# Patient Record
Sex: MALE | HISPANIC OR LATINO | URBAN - METROPOLITAN AREA
[De-identification: names, ages, dates, MRNs, and addresses within clinical notes are randomized per-mention and may not be internally consistent; named-entity substitution may affect disease eponyms.]

---

## 2022-01-19 ENCOUNTER — IMMUNIZATIONS (OUTPATIENT)
Dept: FAMILY MEDICINE CLINIC | Facility: HOSPITAL | Age: 42
End: 2022-01-19

## 2022-01-19 DIAGNOSIS — Z23 ENCOUNTER FOR IMMUNIZATION: Primary | ICD-10-CM

## 2022-01-19 PROCEDURE — 91306 COVID-19 MODERNA VACC 0.25 ML BOOSTER: CPT

## 2022-01-19 PROCEDURE — 0064A COVID-19 MODERNA VACC 0.25 ML BOOSTER: CPT

## 2022-10-20 ENCOUNTER — OFFICE VISIT (OUTPATIENT)
Dept: URGENT CARE | Facility: CLINIC | Age: 42
End: 2022-10-20
Payer: COMMERCIAL

## 2022-10-20 VITALS
BODY MASS INDEX: 28.49 KG/M2 | TEMPERATURE: 98.5 F | DIASTOLIC BLOOD PRESSURE: 79 MMHG | SYSTOLIC BLOOD PRESSURE: 127 MMHG | HEIGHT: 65 IN | WEIGHT: 171 LBS | RESPIRATION RATE: 18 BRPM | OXYGEN SATURATION: 99 % | HEART RATE: 69 BPM

## 2022-10-20 DIAGNOSIS — R51.9 ACUTE NONINTRACTABLE HEADACHE, UNSPECIFIED HEADACHE TYPE: Primary | ICD-10-CM

## 2022-10-20 LAB
SARS-COV-2 AG UPPER RESP QL IA: NEGATIVE
SL AMB POCT GLUCOSE BLD: 189
VALID CONTROL: NORMAL

## 2022-10-20 PROCEDURE — 87811 SARS-COV-2 COVID19 W/OPTIC: CPT | Performed by: PHYSICIAN ASSISTANT

## 2022-10-20 PROCEDURE — 82948 REAGENT STRIP/BLOOD GLUCOSE: CPT | Performed by: PHYSICIAN ASSISTANT

## 2022-10-20 PROCEDURE — 99214 OFFICE O/P EST MOD 30 MIN: CPT | Performed by: PHYSICIAN ASSISTANT

## 2022-10-20 RX ORDER — GLIPIZIDE 10 MG/1
10 TABLET ORAL
COMMUNITY

## 2022-10-20 RX ORDER — LISINOPRIL 2.5 MG/1
2.5 TABLET ORAL DAILY
COMMUNITY

## 2022-10-20 NOTE — PATIENT INSTRUCTIONS
Follow-up with a family doctor  In general we do not do medication refills  If you have an episode of hypoglycemia again go to the emergency room  You tested negative for COVID today

## 2022-10-20 NOTE — PROGRESS NOTES
[facility] [employer] [facility]        NAME: Enriqueta Frank is a 38 y o  male  : 1980    MRN: [id number]  DATE: 2022  TIME: 11:37 AM    Assessment and Plan   Acute nonintractable headache, unspecified headache type [R51 9]  1  Acute nonintractable headache, unspecified headache type  Poct Covid 19 Rapid Antigen Test    POCT blood glucose     The pt is not symptomatic at this time   He had a hypoglycemic yesterday without taking his meds   Recommended PCP's in this area to him         Patient Instructions   Patient Instructions   Follow-up with a family doctor  In general we do not do medication refills  If you have an episode of hypoglycemia again go to the emergency room  You tested negative for COVID today  Follow up with PCP in 3-5 days  Proceed to  ER if symptoms worsen  Chief Complaint     Chief Complaint   Patient presents with   • Headache     Is a diabetic and needs a pcp sent from [facility] with headache and slight  dizziness felt sweaty yesterday   fasting  History of Present Illness       The patient is a 38 year male presenting today for a med refill  He is diabetic and is almost out of his meds  He was sent here today from [facility] for a "med refill "  Blood sugar is 189 today after tea and a piece of bread  Yesterday he felt diaphoretic, and dizzy while running around at his sons soccer game  He believes this was an episode of hypoglycemia  He has had this before  His last episode was 2 years ago  No current symptoms  He denies any shortness of breath or chest pain  Denied any headache dizziness or blurry vision  Review of Systems   Review of Systems   Constitutional: Negative for activity change, appetite change, chills, diaphoresis and fever  HENT: Negative for congestion, ear pain, rhinorrhea and sore throat  Eyes: Negative for pain and visual disturbance  Respiratory: Negative for cough, chest tightness and shortness of breath  Cardiovascular: Negative for chest pain and palpitations  Gastrointestinal: Negative for abdominal pain, diarrhea, nausea and vomiting  Genitourinary: Negative for dysuria and hematuria  Musculoskeletal: Negative for arthralgias, back pain and myalgias  Skin: Negative for color change, pallor and rash  Neurological: Negative for dizziness, seizures, syncope and headaches  All other systems reviewed and are negative  Current Medications       Current Outpatient Medications:   •  glipiZIDE (GLUCOTROL) 10 mg tablet, Take 10 mg by mouth 2 (two) times a day before meals, Disp: , Rfl:   •  lisinopril (ZESTRIL) 2 5 mg tablet, Take 2 5 mg by mouth daily, Disp: , Rfl:   •  metFORMIN (GLUCOPHAGE) 1000 MG tablet, Take 1,000 mg by mouth 2 (two) times a day with meals, Disp: , Rfl:   •  sitaGLIPtin (JANUVIA) 100 mg tablet, Take 100 mg by mouth daily, Disp: , Rfl:     Current Allergies     Allergies as of 10/20/2022   • (No Known Allergies)            The following portions of the patient's history were reviewed and updated as appropriate: allergies, current medications, past family history, past medical history, past social history, past surgical history and problem list      Past Medical History:   Diagnosis Date   • Diabetes mellitus ([facility] [address] )    • Hypertension        No past surgical history on file  No family history on file  Medications have been verified  Objective   /79   Pulse 69   Temp 98 5 °F (36 9 °C)   Resp 18   Ht 5' 5" (1 651 m)   Wt 77 6 kg (171 lb)   SpO2 99%   BMI 28 46 kg/m²        Physical Exam     Physical Exam  Vitals and nursing note reviewed  Constitutional:       General: He is not in acute distress  Appearance: Normal appearance  He is normal weight  He is not ill-appearing, toxic-appearing or diaphoretic  HENT:      Head: Normocephalic and atraumatic  Right Ear: Tympanic membrane, ear canal and external ear normal  There is no impacted cerumen  Left Ear: Tympanic membrane, ear canal and external ear normal  There is no impacted cerumen  Nose: Nose normal  No congestion or rhinorrhea  Mouth/Throat:      Mouth: Mucous membranes are moist       Pharynx: Oropharynx is clear  No oropharyngeal exudate or posterior oropharyngeal erythema  Eyes:      Extraocular Movements: Extraocular movements intact  Conjunctiva/sclera: Conjunctivae normal       Pupils: Pupils are equal, round, and reactive to light  Neck:      Vascular: No carotid bruit  Cardiovascular:      Rate and Rhythm: Normal rate and regular rhythm  Heart sounds: Normal heart sounds  No murmur heard  No friction rub  No gallop  Pulmonary:      Effort: Pulmonary effort is normal  No respiratory distress  Breath sounds: Normal breath sounds  No stridor  No wheezing, rhonchi or rales  Chest:      Chest wall: No tenderness  Abdominal:      General: Abdomen is flat  Bowel sounds are normal  There is no distension  Palpations: Abdomen is soft  There is no mass  Tenderness: There is no abdominal tenderness  There is no guarding or rebound  Hernia: No hernia is present  Musculoskeletal:         General: Normal range of motion  Cervical back: Normal range of motion  No rigidity or tenderness  Lymphadenopathy:      Cervical: No cervical adenopathy  Skin:     General: Skin is warm and dry  Capillary Refill: Capillary refill takes less than 2 seconds  Neurological:      Mental Status: He is alert

## 2024-02-14 ENCOUNTER — HOSPITAL ENCOUNTER (EMERGENCY)
Facility: HOSPITAL | Age: 44
Discharge: HOME | End: 2024-02-14
Attending: EMERGENCY MEDICINE
Payer: COMMERCIAL

## 2024-02-14 VITALS
SYSTOLIC BLOOD PRESSURE: 162 MMHG | HEIGHT: 65 IN | DIASTOLIC BLOOD PRESSURE: 85 MMHG | WEIGHT: 168.8 LBS | BODY MASS INDEX: 28.12 KG/M2 | OXYGEN SATURATION: 100 % | RESPIRATION RATE: 18 BRPM | HEART RATE: 82 BPM | TEMPERATURE: 97.5 F

## 2024-02-14 DIAGNOSIS — L97.509 DIABETIC FOOT ULCER (HCC): Primary | ICD-10-CM

## 2024-02-14 DIAGNOSIS — Z76.0 MEDICATION REFILL: ICD-10-CM

## 2024-02-14 DIAGNOSIS — E11.621 DIABETIC FOOT ULCER (HCC): Primary | ICD-10-CM

## 2024-02-14 PROCEDURE — 99284 EMERGENCY DEPT VISIT MOD MDM: CPT | Performed by: EMERGENCY MEDICINE

## 2024-02-14 PROCEDURE — 99282 EMERGENCY DEPT VISIT SF MDM: CPT

## 2024-02-14 RX ORDER — GLIPIZIDE 10 MG/1
10 TABLET ORAL
Qty: 60 TABLET | Refills: 0 | Status: SHIPPED | OUTPATIENT
Start: 2024-02-14

## 2024-02-14 RX ORDER — GINSENG 100 MG
1 CAPSULE ORAL ONCE
Status: COMPLETED | OUTPATIENT
Start: 2024-02-14 | End: 2024-02-14

## 2024-02-14 RX ORDER — LISINOPRIL 2.5 MG/1
2.5 TABLET ORAL DAILY
Qty: 30 TABLET | Refills: 0 | Status: SHIPPED | OUTPATIENT
Start: 2024-02-14

## 2024-02-14 RX ORDER — AMOXICILLIN AND CLAVULANATE POTASSIUM 875; 125 MG/1; MG/1
1 TABLET, FILM COATED ORAL EVERY 12 HOURS SCHEDULED
Qty: 14 TABLET | Refills: 0 | Status: SHIPPED | OUTPATIENT
Start: 2024-02-14 | End: 2024-02-21

## 2024-02-14 RX ORDER — AMOXICILLIN AND CLAVULANATE POTASSIUM 875; 125 MG/1; MG/1
1 TABLET, FILM COATED ORAL ONCE
Status: COMPLETED | OUTPATIENT
Start: 2024-02-14 | End: 2024-02-14

## 2024-02-14 RX ADMIN — BACITRACIN 1 SMALL APPLICATION: 500 OINTMENT TOPICAL at 19:06

## 2024-02-14 RX ADMIN — AMOXICILLIN AND CLAVULANATE POTASSIUM 1 TABLET: 875; 125 TABLET, FILM COATED ORAL at 19:06

## 2024-02-14 NOTE — Clinical Note
Elan Latham was seen and treated in our emergency department on 2/14/2024.                Diagnosis:     Elan  may return to work on return date.    He may return on this date: 02/19/2024         If you have any questions or concerns, please don't hesitate to call.      Aisha Rios MD    ______________________________           _______________          _______________  Hospital Representative                              Date                                Time

## 2024-02-14 NOTE — DISCHARGE INSTRUCTIONS
Rest off of foot, elevate foot off and on.  Take the antibiotic as prescribed.  Clean daily and apply topical nesporin ointment and keep cushioned with padding if you have to be on the foot.  Follow up with the podiatrist and/or wound care - whoever you can get in with sooner.

## 2024-02-15 NOTE — ED PROVIDER NOTES
History  Chief Complaint   Patient presents with    Wound Check     Pt c/o wound to bottom of left foot w/ Dx diabetes      42 yo male noted pain on bottom of left foot at work today.  Hurts to walk on it.  Asked his wife to look at it and she noted wound.  No specific injury but he is on his feet at work a lot and has to wear heavy boots.  No fever, cough, vomiting, diarrhea.  The pain goes up a bit into his toes.  He also needs refills on his lisinopril and glipizide and hasn't been able to get a hold of his doctor.      History provided by:  Patient   used: No    Wound Check         Prior to Admission Medications   Prescriptions Last Dose Informant Patient Reported? Taking?   glipiZIDE (GLUCOTROL) 10 mg tablet Past Month  Yes Yes   Sig: Take 10 mg by mouth 2 (two) times a day before meals   glipiZIDE (GLUCOTROL) 10 mg tablet   No Yes   Sig: Take 1 tablet (10 mg total) by mouth 2 (two) times a day before meals   lisinopril (ZESTRIL) 2.5 mg tablet 2/13/2024  Yes Yes   Sig: Take 2.5 mg by mouth daily   lisinopril (ZESTRIL) 2.5 mg tablet   No Yes   Sig: Take 1 tablet (2.5 mg total) by mouth daily   metFORMIN (GLUCOPHAGE) 1000 MG tablet 2/14/2024  Yes Yes   Sig: Take 1,000 mg by mouth 2 (two) times a day with meals   sitaGLIPtin (JANUVIA) 100 mg tablet Not Taking  Yes No   Sig: Take 100 mg by mouth daily   Patient not taking: Reported on 2/14/2024      Facility-Administered Medications: None       Past Medical History:   Diagnosis Date    Diabetes mellitus (HCC)     Hypertension        No past surgical history on file.    No family history on file.  I have reviewed and agree with the history as documented.    E-Cigarette/Vaping     E-Cigarette/Vaping Substances          Review of Systems   Constitutional:  Negative for fever.   Respiratory:  Negative for cough.    Gastrointestinal:  Negative for diarrhea and vomiting.   Musculoskeletal:  Positive for gait problem.   Skin:  Positive for wound.  Negative for rash.       Physical Exam  Physical Exam  Vitals and nursing note reviewed.   Constitutional:       General: He is not in acute distress.     Appearance: He is not ill-appearing.   Pulmonary:      Effort: Pulmonary effort is normal. No respiratory distress.   Musculoskeletal:         General: Tenderness present. Normal range of motion.   Skin:     General: Skin is warm and dry.      Findings: Lesion present.      Comments: Bottom of left foot, ball of foot + ulcer wound 4x2 cm with thickened area and then small area blood under the skin but no fluid filled blister area.  Mildly tender.  No drainage, no surrounding redness, no swelling.  DP palp. - see picture   Neurological:      General: No focal deficit present.      Mental Status: He is alert and oriented to person, place, and time.   Psychiatric:         Mood and Affect: Mood normal.         Behavior: Behavior normal.         Vital Signs  ED Triage Vitals [02/14/24 1841]   Temperature Pulse Respirations Blood Pressure SpO2   97.5 °F (36.4 °C) 82 18 162/85 100 %      Temp Source Heart Rate Source Patient Position - Orthostatic VS BP Location FiO2 (%)   Temporal Monitor Sitting Right arm --      Pain Score       8           Vitals:    02/14/24 1841   BP: 162/85   Pulse: 82   Patient Position - Orthostatic VS: Sitting         Visual Acuity      ED Medications  Medications   bacitracin topical ointment 1 small application (has no administration in time range)   amoxicillin-clavulanate (AUGMENTIN) 875-125 mg per tablet 1 tablet (has no administration in time range)       Diagnostic Studies  Results Reviewed       None                   No orders to display              Procedures  Procedures         ED Course                               SBIRT 20yo+      Flowsheet Row Most Recent Value   Initial Alcohol Screen: US AUDIT-C     1. How often do you have a drink containing alcohol? 0 Filed at: 02/14/2024 4993   2. How many drinks containing alcohol do you  have on a typical day you are drinking?  0 Filed at: 02/14/2024 1843   3a. Male UNDER 65: How often do you have five or more drinks on one occasion? 0 Filed at: 02/14/2024 1843   Audit-C Score 0 Filed at: 02/14/2024 1843   KIMBERLI: How many times in the past year have you...    Used an illegal drug or used a prescription medication for non-medical reasons? Never Filed at: 02/14/2024 1843                      Medical Decision Making  Advised rest, foot elevated, abx and follow up outpt. Podiatry and/or wound care.  Given refills on requested meds.    Risk  OTC drugs.  Prescription drug management.             Disposition  Final diagnoses:   Diabetic foot ulcer (HCC)   Medication refill     Time reflects when diagnosis was documented in both MDM as applicable and the Disposition within this note       Time User Action Codes Description Comment    2/14/2024  6:56 PM Aisha iRos Add [E11.621,  L97.509] Diabetic foot ulcer (HCC)     2/14/2024  7:01 PM Aisha Rios Add [Z76.0] Medication refill           ED Disposition       ED Disposition   Discharge    Condition   Stable    Date/Time   Wed Feb 14, 2024 1856    Comment   Elan Latham discharge to home/self care.                   Follow-up Information    None         Patient's Medications   Discharge Prescriptions    AMOXICILLIN-CLAVULANATE (AUGMENTIN) 875-125 MG PER TABLET    Take 1 tablet by mouth every 12 (twelve) hours for 7 days       Start Date: 2/14/2024 End Date: 2/21/2024       Order Dose: 1 tablet       Quantity: 14 tablet    Refills: 0           PDMP Review       None            ED Provider  Electronically Signed by             Aisha Rios MD  02/14/24 0701

## 2024-02-19 ENCOUNTER — OFFICE VISIT (OUTPATIENT)
Age: 44
End: 2024-02-19
Payer: COMMERCIAL

## 2024-02-19 VITALS
WEIGHT: 171 LBS | HEART RATE: 77 BPM | RESPIRATION RATE: 16 BRPM | SYSTOLIC BLOOD PRESSURE: 138 MMHG | DIASTOLIC BLOOD PRESSURE: 70 MMHG | BODY MASS INDEX: 28.49 KG/M2 | HEIGHT: 65 IN

## 2024-02-19 DIAGNOSIS — E11.621 DIABETIC FOOT ULCER (HCC): ICD-10-CM

## 2024-02-19 DIAGNOSIS — E11.42 DIABETIC POLYNEUROPATHY ASSOCIATED WITH TYPE 2 DIABETES MELLITUS (HCC): ICD-10-CM

## 2024-02-19 DIAGNOSIS — M21.962 ACQUIRED DEFORMITY OF BOTH FEET: ICD-10-CM

## 2024-02-19 DIAGNOSIS — L97.421 DIABETIC ULCER OF LEFT MIDFOOT ASSOCIATED WITH TYPE 2 DIABETES MELLITUS, LIMITED TO BREAKDOWN OF SKIN (HCC): Primary | ICD-10-CM

## 2024-02-19 DIAGNOSIS — M77.42 METATARSALGIA OF BOTH FEET: ICD-10-CM

## 2024-02-19 DIAGNOSIS — M21.961 ACQUIRED DEFORMITY OF BOTH FEET: ICD-10-CM

## 2024-02-19 DIAGNOSIS — L97.509 DIABETIC FOOT ULCER (HCC): ICD-10-CM

## 2024-02-19 DIAGNOSIS — E11.621 DIABETIC ULCER OF LEFT MIDFOOT ASSOCIATED WITH TYPE 2 DIABETES MELLITUS, LIMITED TO BREAKDOWN OF SKIN (HCC): Primary | ICD-10-CM

## 2024-02-19 DIAGNOSIS — M77.41 METATARSALGIA OF BOTH FEET: ICD-10-CM

## 2024-02-19 PROCEDURE — 87205 SMEAR GRAM STAIN: CPT | Performed by: PODIATRIST

## 2024-02-19 PROCEDURE — 99203 OFFICE O/P NEW LOW 30 MIN: CPT | Performed by: PODIATRIST

## 2024-02-19 PROCEDURE — 87070 CULTURE OTHR SPECIMN AEROBIC: CPT | Performed by: PODIATRIST

## 2024-02-19 PROCEDURE — 97597 DBRDMT OPN WND 1ST 20 CM/<: CPT | Performed by: PODIATRIST

## 2024-02-19 NOTE — PROGRESS NOTES
"Assessment/Plan: Schmidt grade 1 ulcer submetatarsal 3 left foot.  Diabetic neuropathy.  Acquired deformity foot causing pressure point bilateral.  Patient with out cellulitis.  Pain upon ambulation.    Plan.  Chart reviewed.  PCP notes reviewed.  Patient examined.  With use of  patient has been advised of diabetic foot ulcer and its etiology and treatment.  Today he consents to wound debridement.  This was done.  In addition gentian violet dry sterile dressing applied.  Patient may started on Silvadene daily.  In addition, he will watch for signs of infection.  Return for follow-up.    Debridement    Universal Protocol:  Consent: Verbal consent obtained.  Risks and benefits: risks, benefits and alternatives were discussed  Consent given by: patient  Time out: Immediately prior to procedure a \"time out\" was called to verify the correct patient, procedure, equipment, support staff and site/side marked as required.  Timeout called at: 2/19/2024 2:37 PM.  Patient understanding: patient states understanding of the procedure being performed  Patient identity confirmed: verbally with patient    Debridement Details  Performed by: physician  Debridement type: selective  Pain control: none      Post-debridement measurements  Length (cm): 0.5  Width (cm): 0.5  Depth (cm): 0.1  Percent debrided: 100%  Surface Area (cm^2): 0.25  Area Debrided (cm^2): 0.25  Volume (cm^3): 0.03    Devitalized tissue debrided: callus, clots and eschar  Instrument(s) utilized: blade  Bleeding: none  Hemostasis obtained with: not applicable  Procedural pain (0-10): insensate  Post-procedural pain: insensate   Response to treatment: procedure was tolerated well                     Diagnoses and all orders for this visit:    Diabetic foot ulcer (HCC)  -     Ambulatory Referral to Podiatry          Subjective: Patient is seen in referral from emergency room.  Patient is diabetic with a foot wound of the left foot.  Is been there for several " weeks.  No history of trauma            No Known Allergies      Current Outpatient Medications:     amoxicillin-clavulanate (AUGMENTIN) 875-125 mg per tablet, Take 1 tablet by mouth every 12 (twelve) hours for 7 days, Disp: 14 tablet, Rfl: 0    glipiZIDE (GLUCOTROL) 10 mg tablet, Take 1 tablet (10 mg total) by mouth 2 (two) times a day before meals, Disp: 60 tablet, Rfl: 0    lisinopril (ZESTRIL) 2.5 mg tablet, Take 1 tablet (2.5 mg total) by mouth daily, Disp: 30 tablet, Rfl: 0    metFORMIN (GLUCOPHAGE) 1000 MG tablet, Take 1,000 mg by mouth 2 (two) times a day with meals, Disp: , Rfl:     sitaGLIPtin (JANUVIA) 100 mg tablet, Take 100 mg by mouth daily (Patient not taking: Reported on 2/14/2024), Disp: , Rfl:     There is no problem list on file for this patient.         Patient ID: Elan Latham is a 43 y.o. male.    HPI    The following portions of the patient's history were reviewed and updated as appropriate: He  has a past medical history of Diabetes mellitus (HCC) and Hypertension.  He There are no problems to display for this patient.    He  has no past surgical history on file.  His family history is not on file.  He  has no history on file for tobacco use, alcohol use, and drug use.  Current Outpatient Medications   Medication Sig Dispense Refill    amoxicillin-clavulanate (AUGMENTIN) 875-125 mg per tablet Take 1 tablet by mouth every 12 (twelve) hours for 7 days 14 tablet 0    glipiZIDE (GLUCOTROL) 10 mg tablet Take 1 tablet (10 mg total) by mouth 2 (two) times a day before meals 60 tablet 0    lisinopril (ZESTRIL) 2.5 mg tablet Take 1 tablet (2.5 mg total) by mouth daily 30 tablet 0    metFORMIN (GLUCOPHAGE) 1000 MG tablet Take 1,000 mg by mouth 2 (two) times a day with meals      sitaGLIPtin (JANUVIA) 100 mg tablet Take 100 mg by mouth daily (Patient not taking: Reported on 2/14/2024)       No current facility-administered medications for this visit.     Current Outpatient Medications on File Prior to  Visit   Medication Sig    amoxicillin-clavulanate (AUGMENTIN) 875-125 mg per tablet Take 1 tablet by mouth every 12 (twelve) hours for 7 days    glipiZIDE (GLUCOTROL) 10 mg tablet Take 1 tablet (10 mg total) by mouth 2 (two) times a day before meals    lisinopril (ZESTRIL) 2.5 mg tablet Take 1 tablet (2.5 mg total) by mouth daily    metFORMIN (GLUCOPHAGE) 1000 MG tablet Take 1,000 mg by mouth 2 (two) times a day with meals    sitaGLIPtin (JANUVIA) 100 mg tablet Take 100 mg by mouth daily (Patient not taking: Reported on 2/14/2024)     No current facility-administered medications on file prior to visit.     He has No Known Allergies..    There were no vitals filed for this visit.    Review of Systems      Objective:  Patient's shoes and socks removed.   Foot Exam    General  General Appearance: appears stated age and healthy   Orientation: alert and oriented to person, place, and time   Affect: appropriate   Gait: antalgic       Right Foot/Ankle     Inspection and Palpation  Swelling: dorsum   Arch: pes planus  Hammertoes: fifth toe    Neurovascular  Dorsalis pedis: 3+  Posterior tibial: 3+  Saphenous nerve sensation: diminished  Tibial nerve sensation: diminished  Superficial peroneal nerve sensation: diminished  Deep peroneal nerve sensation: diminished  Sural nerve sensation: diminished      Left Foot/Ankle      Inspection and Palpation  Swelling: dorsum   Arch: pes planus  Hammertoes: fifth toe  Skin Exam: ulcer;     Neurovascular  Dorsalis pedis: 3+  Posterior tibial: 3+  Saphenous nerve sensation: diminished  Tibial nerve sensation: diminished  Superficial peroneal nerve sensation: diminished  Deep peroneal nerve sensation: diminished  Sural nerve sensation: diminished      Physical Exam  Constitutional:       Appearance: Normal appearance.   Cardiovascular:      Rate and Rhythm: Normal rate and regular rhythm.      Pulses: no weak pulses.           Dorsalis pedis pulses are 3+ on the right side and 3+ on the  left side.        Posterior tibial pulses are 3+ on the right side and 3+ on the left side.   Feet:      Left foot:      Skin integrity: Ulcer present.      Comments: Patient has plantarflexed third metatarsal bilateral.  On the left foot there is a 0.5 cm grade Schmidt grade 1 ulcer with hyperkeratotic borders.  Negative cellulitis.  Skin:     Capillary Refill: Capillary refill takes less than 2 seconds.   Neurological:      Mental Status: He is alert.     Patient's shoes and socks removed.    Right Foot/Ankle   Right Foot Inspection      Toe Exam: right toe deformity.     Sensory   Vibration: diminished  Proprioception: diminished  Monofilament testing: diminished    Vascular  Capillary refills: < 3 seconds  The right DP pulse is 3+. The right PT pulse is 3+.     Right Toe  - Comprehensive Exam  Arch: pes planus  Hammertoes: fifth toe  Swelling: dorsum       Left Foot/Ankle  Left Foot Inspection  Skin Exam: ulcer.     Toe Exam: left toe deformity.     Sensory   Vibration: diminished  Proprioception: diminished  Monofilament testing: diminished    Vascular  Capillary refills: < 3 seconds  The left DP pulse is 3+. The left PT pulse is 3+.     Left Toe  - Comprehensive Exam  Arch: pes planus  Hammertoes: fifth toe  Swelling: dorsum       Assign Risk Category  Deformity present  Loss of protective sensation  No weak pulses  Risk: 2

## 2024-02-22 LAB
BACTERIA WND AEROBE CULT: NO GROWTH
GRAM STN SPEC: NORMAL
GRAM STN SPEC: NORMAL

## 2024-04-30 ENCOUNTER — OFFICE VISIT (OUTPATIENT)
Age: 44
End: 2024-04-30
Payer: COMMERCIAL

## 2024-04-30 VITALS
BODY MASS INDEX: 28.49 KG/M2 | RESPIRATION RATE: 16 BRPM | DIASTOLIC BLOOD PRESSURE: 80 MMHG | HEART RATE: 77 BPM | WEIGHT: 171 LBS | SYSTOLIC BLOOD PRESSURE: 140 MMHG | HEIGHT: 65 IN

## 2024-04-30 DIAGNOSIS — B35.1 ONYCHOMYCOSIS: ICD-10-CM

## 2024-04-30 DIAGNOSIS — M21.962 ACQUIRED DEFORMITY OF BOTH FEET: ICD-10-CM

## 2024-04-30 DIAGNOSIS — B35.9 DERMATOPHYTOSIS: ICD-10-CM

## 2024-04-30 DIAGNOSIS — M21.961 ACQUIRED DEFORMITY OF BOTH FEET: ICD-10-CM

## 2024-04-30 DIAGNOSIS — L97.421 DIABETIC ULCER OF LEFT MIDFOOT ASSOCIATED WITH TYPE 2 DIABETES MELLITUS, LIMITED TO BREAKDOWN OF SKIN (HCC): Primary | ICD-10-CM

## 2024-04-30 DIAGNOSIS — M77.42 METATARSALGIA OF BOTH FEET: ICD-10-CM

## 2024-04-30 DIAGNOSIS — E11.42 DIABETIC POLYNEUROPATHY ASSOCIATED WITH TYPE 2 DIABETES MELLITUS (HCC): ICD-10-CM

## 2024-04-30 DIAGNOSIS — E11.621 DIABETIC ULCER OF LEFT MIDFOOT ASSOCIATED WITH TYPE 2 DIABETES MELLITUS, LIMITED TO BREAKDOWN OF SKIN (HCC): Primary | ICD-10-CM

## 2024-04-30 DIAGNOSIS — M77.41 METATARSALGIA OF BOTH FEET: ICD-10-CM

## 2024-04-30 PROCEDURE — 99213 OFFICE O/P EST LOW 20 MIN: CPT | Performed by: PODIATRIST

## 2024-04-30 RX ORDER — TERBINAFINE HYDROCHLORIDE 250 MG/1
250 TABLET ORAL DAILY
Qty: 30 TABLET | Refills: 0 | Status: SHIPPED | OUTPATIENT
Start: 2024-04-30 | End: 2024-05-30

## 2024-04-30 NOTE — PROGRESS NOTES
Assessment/Plan: Healed diabetic foot ulcer, left.  Diabetic neuropathy.  Pain upon ambulation.  Acquired deformity of foot.  Mycosis of nail and skin.    Plan.  Chart reviewed.  PCP notes reviewed.  Lab work reviewed.  Patient has no elevation in liver function enzymes.  Patient evaluated.  Patient advised on care of the diabetic foot.  At this time he will continue topical antifungal as directed.  We will continue monitoring for signs of recurrence of infection and ulceration.  We recommend periodic podiatric care.  In addition we will add oral terbinafine.  Aftercare instruction given.  Return for follow-up.         Diagnoses and all orders for this visit:    Diabetic ulcer of left midfoot associated with type 2 diabetes mellitus, limited to breakdown of skin (HCC)    Diabetic polyneuropathy associated with type 2 diabetes mellitus (HCC)    Metatarsalgia of both feet    Acquired deformity of both feet    Onychomycosis  -     terbinafine (LamISIL) 250 mg tablet; Take 1 tablet (250 mg total) by mouth daily    Dermatophytosis  -     terbinafine (LamISIL) 250 mg tablet; Take 1 tablet (250 mg total) by mouth daily          Subjective: Patient is diabetic.  He has history of foot ulcer.  He presents for evaluation.  No history of fever or night sweats.    No Known Allergies      Current Outpatient Medications:     terbinafine (LamISIL) 250 mg tablet, Take 1 tablet (250 mg total) by mouth daily, Disp: 30 tablet, Rfl: 0    glipiZIDE (GLUCOTROL) 10 mg tablet, Take 1 tablet (10 mg total) by mouth 2 (two) times a day before meals, Disp: 60 tablet, Rfl: 0    lisinopril (ZESTRIL) 2.5 mg tablet, Take 1 tablet (2.5 mg total) by mouth daily, Disp: 30 tablet, Rfl: 0    metFORMIN (GLUCOPHAGE) 1000 MG tablet, Take 1,000 mg by mouth 2 (two) times a day with meals, Disp: , Rfl:     sitaGLIPtin (JANUVIA) 100 mg tablet, Take 100 mg by mouth daily (Patient not taking: Reported on 2/14/2024), Disp: , Rfl:     There is no problem list on  file for this patient.         Patient ID: Elan Latham is a 43 y.o. male.    HPI    The following portions of the patient's history were reviewed and updated as appropriate:     family history is not on file.      reports that he has never smoked. He has never used smokeless tobacco. No history on file for alcohol use and drug use.    Vitals:    04/30/24 1609   BP: 140/80   Pulse: 77   Resp: 16       Review of Systems      Objective:  Patient's shoes and socks removed.   Foot ExamPhysical Exam       Foot Exam     General  General Appearance: appears stated age and healthy   Orientation: alert and oriented to person, place, and time   Affect: appropriate   Gait: antalgic         Right Foot/Ankle      Inspection and Palpation  Swelling: dorsum   Arch: pes planus  Hammertoes: fifth toe     Neurovascular  Dorsalis pedis: 3+  Posterior tibial: 3+  Saphenous nerve sensation: diminished  Tibial nerve sensation: diminished  Superficial peroneal nerve sensation: diminished  Deep peroneal nerve sensation: diminished  Sural nerve sensation: diminished        Left Foot/Ankle       Inspection and Palpation  Swelling: dorsum   Arch: pes planus  Hammertoes: fifth toe  Skin Exam: ulcer;      Neurovascular  Dorsalis pedis: 3+  Posterior tibial: 3+  Saphenous nerve sensation: diminished  Tibial nerve sensation: diminished  Superficial peroneal nerve sensation: diminished  Deep peroneal nerve sensation: diminished  Sural nerve sensation: diminished        Physical Exam  Constitutional:       Appearance: Normal appearance.   Cardiovascular:      Rate and Rhythm: Normal rate and regular rhythm.      Pulses: no weak pulses.           Dorsalis pedis pulses are 3+ on the right side and 3+ on the left side.        Posterior tibial pulses are 3+ on the right side and 3+ on the left side.   Feet:      Left foot:      Skin integrity: Ulcer present.      Comments: Patient has plantarflexed third metatarsal bilateral.  On the left foot there  is a 0.5 cm grade Schmidt grade 0 ulcer with hyperkeratotic borders.  Negative cellulitis.  Skin:     Capillary Refill: Capillary refill takes less than 2 seconds.   Neurological:      Mental Status: He is alert.      Patient's shoes and socks removed.     Right Foot/Ankle   Right Foot Inspection        Toe Exam: right toe deformity.      Sensory   Vibration: diminished  Proprioception: diminished  Monofilament testing: diminished     Vascular  Capillary refills: < 3 seconds  The right DP pulse is 3+. The right PT pulse is 3+.      Right Toe  - Comprehensive Exam  Arch: pes planus  Hammertoes: fifth toe  Swelling: dorsum         Left Foot/Ankle  Left Foot Inspection  Skin Exam: ulcer.      Toe Exam: left toe deformity.      Sensory   Vibration: diminished  Proprioception: diminished  Monofilament testing: diminished     Vascular  Capillary refills: < 3 seconds  The left DP pulse is 3+. The left PT pulse is 3+.      Left Toe  - Comprehensive Exam  Arch: pes planus  Hammertoes: fifth toe  Swelling: dorsum         Assign Risk Category  Deformity present  Loss of protective sensation  No weak pulses  Risk: 2

## 2025-02-15 ENCOUNTER — APPOINTMENT (EMERGENCY)
Dept: RADIOLOGY | Facility: HOSPITAL | Age: 45
End: 2025-02-15
Payer: COMMERCIAL

## 2025-02-15 ENCOUNTER — HOSPITAL ENCOUNTER (EMERGENCY)
Facility: HOSPITAL | Age: 45
Discharge: HOME | End: 2025-02-15
Attending: EMERGENCY MEDICINE | Admitting: EMERGENCY MEDICINE
Payer: COMMERCIAL

## 2025-02-15 VITALS
OXYGEN SATURATION: 97 % | WEIGHT: 169 LBS | RESPIRATION RATE: 24 BRPM | DIASTOLIC BLOOD PRESSURE: 72 MMHG | BODY MASS INDEX: 28.12 KG/M2 | SYSTOLIC BLOOD PRESSURE: 130 MMHG | TEMPERATURE: 98.1 F | HEART RATE: 77 BPM

## 2025-02-15 DIAGNOSIS — J10.1 INFLUENZA A: Primary | ICD-10-CM

## 2025-02-15 LAB
FLUAV AG UPPER RESP QL IA.RAPID: POSITIVE
FLUBV AG UPPER RESP QL IA.RAPID: NEGATIVE
GLUCOSE SERPL-MCNC: 207 MG/DL (ref 65–140)
S PYO DNA THROAT QL NAA+PROBE: NOT DETECTED
SARS-COV+SARS-COV-2 AG RESP QL IA.RAPID: NEGATIVE

## 2025-02-15 PROCEDURE — 87811 SARS-COV-2 COVID19 W/OPTIC: CPT | Performed by: PHYSICIAN ASSISTANT

## 2025-02-15 PROCEDURE — 82948 REAGENT STRIP/BLOOD GLUCOSE: CPT

## 2025-02-15 PROCEDURE — 87804 INFLUENZA ASSAY W/OPTIC: CPT | Performed by: PHYSICIAN ASSISTANT

## 2025-02-15 PROCEDURE — 71046 X-RAY EXAM CHEST 2 VIEWS: CPT

## 2025-02-15 PROCEDURE — 99284 EMERGENCY DEPT VISIT MOD MDM: CPT | Performed by: PHYSICIAN ASSISTANT

## 2025-02-15 PROCEDURE — 99284 EMERGENCY DEPT VISIT MOD MDM: CPT

## 2025-02-15 PROCEDURE — 87651 STREP A DNA AMP PROBE: CPT | Performed by: PHYSICIAN ASSISTANT

## 2025-02-15 NOTE — Clinical Note
Elan Latham was seen and treated in our emergency department on 2/15/2025.                Diagnosis:     Elan  .    He may return on this date:     Elan Latham is excused from work Sunday February 16th     If you have any questions or concerns, please don't hesitate to call.      Juan Carlos Sanchez PA-C    ______________________________           _______________          _______________  Hospital Representative                              Date                                Time

## 2025-02-15 NOTE — DISCHARGE INSTRUCTIONS
May take tylenol or motrin (with food) every 6 hours as needed for fever    Follow up with your primary care provider for recheck if any persistent concerns next 3 days    Return to ED for shortness of breath, new or worsening symptoms

## 2025-02-15 NOTE — ED PROVIDER NOTES
Time reflects when diagnosis was documented in both MDM as applicable and the Disposition within this note       Time User Action Codes Description Comment    2/15/2025  3:48 PM Juan Carlos Sanchez Add [J10.1] Influenza A           ED Disposition       ED Disposition   Discharge    Condition   Stable    Date/Time   Sat Feb 15, 2025  3:48 PM    Comment   Elan Latham discharge to home/self care.                   Assessment & Plan       Medical Decision Making  Differential diagnosis includes COVID, influenza, pneumonia, viral syndrome.  Patient is positive for flu A.  I ordered a chest x-ray.  I independently interpreted as no acute cardiopulmonary abnormality.  Pulse ox is 97% on room air indicating adequate oxygenation.  Patient is beyond 48-hour window for Tamiflu  Symptomatic treatment advised including Tylenol and Motrin for fever.  Advise follow-up with primary care provider for recheck for any persistent concerns next 2 to 3 days.  Return precautions were reviewed including shortness of breath or worsening symptoms.    Amount and/or Complexity of Data Reviewed  Labs: ordered.  Radiology: ordered.             Medications - No data to display    ED Risk Strat Scores                                                History of Present Illness       Chief Complaint   Patient presents with    Cough     Started with cough 4 days ago, had fever couple of days ago. Now some upper abd pain, occ diarrhea       Past Medical History:   Diagnosis Date    Diabetes mellitus (HCC)     Hypertension       History reviewed. No pertinent surgical history.   History reviewed. No pertinent family history.   Social History     Tobacco Use    Smoking status: Never    Smokeless tobacco: Never   Vaping Use    Vaping status: Never Used   Substance Use Topics    Alcohol use: Not Currently    Drug use: Not Currently      E-Cigarette/Vaping    E-Cigarette Use Never User       E-Cigarette/Vaping Substances      I have reviewed and agree with  the history as documented.     Patient is a 45 yo  female with history of DM and HTN who reports 3-4 day history of cough, subjective fever (states he was sweaty but did not take his temperature), pain to lower chest only with coughing. Also reports sore throat. No vomiting. Wife had covid last week         Review of Systems   Constitutional:  Negative for fever.   HENT:  Positive for sore throat.    Respiratory:  Positive for cough. Negative for shortness of breath.    Cardiovascular:  Negative for chest pain.   Gastrointestinal:  Negative for abdominal pain, nausea and vomiting.   Genitourinary:  Negative for flank pain.   Neurological:  Negative for headaches.           Objective       ED Triage Vitals [02/15/25 1411]   Temperature Pulse Blood Pressure Respirations SpO2 Patient Position - Orthostatic VS   98.1 °F (36.7 °C) 77 130/72 (!) 24 97 % Sitting      Temp Source Heart Rate Source BP Location FiO2 (%) Pain Score    Tympanic Monitor Right arm -- 8      Vitals      Date and Time Temp Pulse SpO2 Resp BP Pain Score FACES Pain Rating User   02/15/25 1411 98.1 °F (36.7 °C) 77 97 % 24 130/72 8 -- LS            Physical Exam  Vitals and nursing note reviewed.   Constitutional:       General: He is not in acute distress.     Appearance: Normal appearance. He is not ill-appearing, toxic-appearing or diaphoretic.   HENT:      Head: Normocephalic and atraumatic.      Right Ear: Tympanic membrane, ear canal and external ear normal.      Left Ear: Tympanic membrane, ear canal and external ear normal.      Nose: Nose normal.      Mouth/Throat:      Mouth: Mucous membranes are moist.      Pharynx: Oropharynx is clear.   Eyes:      Extraocular Movements: Extraocular movements intact.      Conjunctiva/sclera: Conjunctivae normal.      Pupils: Pupils are equal, round, and reactive to light.   Cardiovascular:      Rate and Rhythm: Normal rate and regular rhythm.      Pulses: Normal pulses.      Heart sounds: Normal  heart sounds.   Pulmonary:      Effort: Pulmonary effort is normal.      Breath sounds: Normal breath sounds.   Abdominal:      General: Abdomen is flat. Bowel sounds are normal.      Palpations: Abdomen is soft.   Musculoskeletal:         General: Normal range of motion.      Cervical back: Normal range of motion and neck supple. No rigidity or tenderness.   Lymphadenopathy:      Cervical: No cervical adenopathy.   Skin:     General: Skin is warm and dry.      Capillary Refill: Capillary refill takes less than 2 seconds.   Neurological:      General: No focal deficit present.      Mental Status: He is alert and oriented to person, place, and time. Mental status is at baseline.         Results Reviewed       Procedure Component Value Units Date/Time    Strep A PCR [[id number]]  (Normal) Collected: 02/15/25 1440    Lab Status: Final result Specimen: Throat Updated: 02/15/25 1511     STREP A PCR Not Detected    FLU/COVID Rapid Antigen (30 min. TAT) - Preferred screening test in ED [[id number]]  (Abnormal) Collected: 02/15/25 1440    Lab Status: Final result Specimen: Nares from Nose Updated: 02/15/25 1506     SARS COV Rapid Antigen Negative     Influenza A Rapid Antigen Positive     Influenza B Rapid Antigen Negative    Narrative:      This test has been performed using the [employer] Rosemary 2 FLU+SARS Antigen test under the Emergency Use Authorization (EUA). This test has been validated by the  and verified by the performing laboratory. The Rosemary uses lateral flow immunofluorescent sandwich assay to detect SARS-COV, Influenza A and Influenza B Antigen.     The [employer] Rosemary 2 SARS Antigen test does not differentiate between SARS-CoV and SARS-CoV-2.     Negative results are presumptive and may be confirmed with a molecular assay, if necessary, for patient management. Negative results do not rule out SARS-CoV-2 or influenza infection and should not be used as the sole basis for treatment or patient management  decisions. A negative test result may occur if the level of antigen in a sample is below the limit of detection of this test.     Positive results are indicative of the presence of viral antigens, but do not rule out bacterial infection or co-infection with other viruses.     All test results should be used as an adjunct to clinical observations and other information available to the provider.    FOR PEDIATRIC PATIENTS - copy/paste COVID Guidelines URL to browser: [email or web address]    Fingerstick Glucose (POCT) [[id number]]  (Abnormal) Collected: 02/15/25 1439    Lab Status: Final result Specimen: Blood Updated: 02/15/25 1439     POC Glucose 207 mg/dl             XR chest 2 views    (Results Pending)       Procedures    ED Medication and Procedure Management   Prior to Admission Medications   Prescriptions Last Dose Informant Patient Reported? Taking?   glipiZIDE (GLUCOTROL) 10 mg tablet   No No   Sig: Take 1 tablet (10 mg total) by mouth 2 (two) times a day before meals   lisinopril (ZESTRIL) 2.5 mg tablet   No No   Sig: Take 1 tablet (2.5 mg total) by mouth daily   metFORMIN (GLUCOPHAGE) 1000 MG tablet   Yes No   Sig: Take 1,000 mg by mouth 2 (two) times a day with meals   sitaGLIPtin (JANUVIA) 100 mg tablet   Yes No   Sig: Take 100 mg by mouth daily   Patient not taking: Reported on 2/14/2024      Facility-Administered Medications: None     Discharge Medication List as of 2/15/2025  3:50 PM        CONTINUE these medications which have NOT CHANGED    Details   glipiZIDE (GLUCOTROL) 10 mg tablet Take 1 tablet (10 mg total) by mouth 2 (two) times a day before meals, Starting Wed 2/14/2024, Normal      lisinopril (ZESTRIL) 2.5 mg tablet Take 1 tablet (2.5 mg total) by mouth daily, Starting Wed 2/14/2024, Normal      metFORMIN (GLUCOPHAGE) 1000 MG tablet Take 1,000 mg by mouth 2 (two) times a day with meals, Historical Med      sitaGLIPtin (JANUVIA) 100 mg tablet  Take 100 mg by mouth daily, Historical Med           No discharge procedures on file.  ED SEPSIS DOCUMENTATION   Time reflects when diagnosis was documented in both MDM as applicable and the Disposition within this note       Time User Action Codes Description Comment    2/15/2025  3:48 PM Juan Carlos Sanchez Add [J10.1] Influenza A                  Juan Carlos Sanchez PA-C  02/15/25 1700